# Patient Record
Sex: MALE | Race: WHITE | NOT HISPANIC OR LATINO | Employment: UNEMPLOYED | URBAN - NONMETROPOLITAN AREA
[De-identification: names, ages, dates, MRNs, and addresses within clinical notes are randomized per-mention and may not be internally consistent; named-entity substitution may affect disease eponyms.]

---

## 2023-05-29 ENCOUNTER — OFFICE VISIT (OUTPATIENT)
Dept: FAMILY MEDICINE | Facility: OTHER | Age: 48
End: 2023-05-29
Attending: PHYSICIAN ASSISTANT
Payer: COMMERCIAL

## 2023-05-29 ENCOUNTER — TELEPHONE (OUTPATIENT)
Dept: FAMILY MEDICINE | Facility: OTHER | Age: 48
End: 2023-05-29

## 2023-05-29 VITALS
HEIGHT: 68 IN | SYSTOLIC BLOOD PRESSURE: 108 MMHG | DIASTOLIC BLOOD PRESSURE: 74 MMHG | OXYGEN SATURATION: 93 % | HEART RATE: 116 BPM | TEMPERATURE: 98 F | BODY MASS INDEX: 20.76 KG/M2 | RESPIRATION RATE: 16 BRPM | WEIGHT: 137 LBS

## 2023-05-29 DIAGNOSIS — K21.9 GASTROESOPHAGEAL REFLUX DISEASE, UNSPECIFIED WHETHER ESOPHAGITIS PRESENT: Primary | ICD-10-CM

## 2023-05-29 PROCEDURE — G0463 HOSPITAL OUTPT CLINIC VISIT: HCPCS

## 2023-05-29 PROCEDURE — 99203 OFFICE O/P NEW LOW 30 MIN: CPT | Performed by: NURSE PRACTITIONER

## 2023-05-29 RX ORDER — OMEPRAZOLE 40 MG/1
40 CAPSULE, DELAYED RELEASE ORAL DAILY
Qty: 90 CAPSULE | Refills: 0 | Status: SHIPPED | OUTPATIENT
Start: 2023-05-29 | End: 2023-08-27

## 2023-05-29 ASSESSMENT — PAIN SCALES - GENERAL: PAINLEVEL: EXTREME PAIN (8)

## 2023-05-29 NOTE — TELEPHONE ENCOUNTER
Called [facility] to inform of one 40mg dose today.   Ashley Thompson, LPN on 5/29/2023 at 12:52 PM

## 2023-05-29 NOTE — PROGRESS NOTES
ASSESSMENT/PLAN:     I have reviewed the nursing notes.  I have reviewed the findings, diagnosis, plan and need for follow up with the patient.        1. Gastroesophageal reflux disease, unspecified whether esophagitis present    - omeprazole (PRILOSEC) 40 MG DR capsule; Take 1 capsule (40 mg) by mouth daily for 90 days  Dispense: 90 capsule; Refill: 0      Symptomatic treatment - take medication 30 to 60 minutes prior to meal, avoids meals 2 to 3 hours prior to bedtime, sleep elevated, eat smaller meals, etc      Discussed warning signs/symptoms indicative of need to f/u  Follow up if symptoms persist or worsen or concerns      I explained my diagnostic considerations and recommendations to the patient, who voiced understanding and agreement with the treatment plan. All questions were answered. We discussed potential side effects of any prescribed or recommended therapies, as well as expectations for response to treatments.    Jossy Lara NP  [facility]      SUBJECTIVE:   Hugo Kaiser is a 47 year old male who presents to clinic today for the following health issues:  Needs Prilosec    HPI   Hx of acid reflux with use of daily Prilosec 20 mg daily for the past 10 years.  He has been out the past 4 to 5 days as he admitted himself anteriorly to [facility] for meth use and needs medications as a prescription and not over-the-counter.  Symptoms including burning, tightness, and pressure in throat and chest.  Symptoms are controlled with medication but symptomatic if not treating. No chest pain or palpitations.  No light headedness or dizziness.  Occasional shortness of breath and cough due to tobacco use.  No fevers.  No abdominal pain.  No abnormal stools or bloody stools.          No past medical history on file.  No past surgical history on file.  Social History     Tobacco Use     Smoking status: Every Day     Types: Cigarettes     Smokeless tobacco: Former     Types:  "Chew   Vaping Use     Vaping status: Never Used   Substance Use Topics     Alcohol use: Not Currently     No current outpatient medications on file.     Allergies   Allergen Reactions     Measles, Mumps & Rubella Vac          Past medical history, past surgical history, current medications and allergies reviewed and accurate to the best of my knowledge.        OBJECTIVE:     /74 (BP Location: Right arm, Patient Position: Sitting, Cuff Size: Adult Regular)   Pulse 116   Temp 98  F (36.7  C) (Tympanic)   Resp 16   Ht 1.727 m (5' 8\")   Wt 62.1 kg (137 lb)   SpO2 93%   BMI 20.83 kg/m    Body mass index is 20.83 kg/m .     Physical Exam  General Appearance: Well appearing adult male, appropriate appearance for age. No acute distress  Neck: supple without adenopathy  Respiratory: normal chest wall and respirations.  Normal effort.  Clear to auscultation bilaterally, no wheezing, crackles or rhonchi.  No increased work of breathing.  No cough appreciated.  Cardiac: RRR with no murmurs  Abdomen:  Mild epigastric tenderness  Musculoskeletal:  Equal movement of bilateral upper extremities.  Equal movement of bilateral lower extremities.  Normal gait.   Psychological: normal affect, alert, oriented, and pleasant, tearful and emotional    "

## 2023-05-29 NOTE — NURSING NOTE
"Pt presents to  for acid reflux. Pt currently in treatment and needs to have prescribed med to take for it.    Chief Complaint   Patient presents with     Gastrophageal Reflux       FOOD SECURITY SCREENING QUESTIONS  Hunger Vital Signs:  Within the past 12 months we worried whether our food would run out before we got money to buy more. Never  Within the past 12 months the food we bought just didn't last and we didn't have money to get more. Never  Lauren Dearmon 5/29/2023 10:12 AM      Initial /74 (BP Location: Right arm, Patient Position: Sitting, Cuff Size: Adult Regular)   Pulse 116   Temp 98  F (36.7  C) (Tympanic)   Resp 16   Ht 1.727 m (5' 8\")   Wt 62.1 kg (137 lb)   SpO2 93%   BMI 20.83 kg/m   Estimated body mass index is 20.83 kg/m  as calculated from the following:    Height as of this encounter: 1.727 m (5' 8\").    Weight as of this encounter: 62.1 kg (137 lb).  Medication Reconciliation: complete    Lauren Harsha  "

## 2023-05-29 NOTE — TELEPHONE ENCOUNTER
We increased the dose from 20 mg to 40 mg so he will take 1 dose of 40 mg today and continue daily.  Jossy Lara NP on 5/29/2023 at 12:43 PM

## 2023-05-31 ENCOUNTER — OFFICE VISIT (OUTPATIENT)
Dept: INTERNAL MEDICINE | Facility: OTHER | Age: 48
End: 2023-05-31
Payer: COMMERCIAL

## 2023-05-31 VITALS
DIASTOLIC BLOOD PRESSURE: 62 MMHG | BODY MASS INDEX: 20.92 KG/M2 | RESPIRATION RATE: 16 BRPM | WEIGHT: 137.6 LBS | OXYGEN SATURATION: 96 % | SYSTOLIC BLOOD PRESSURE: 120 MMHG | HEART RATE: 115 BPM | TEMPERATURE: 97.8 F

## 2023-05-31 DIAGNOSIS — K04.7 TOOTH INFECTION: Primary | ICD-10-CM

## 2023-05-31 PROCEDURE — G0463 HOSPITAL OUTPT CLINIC VISIT: HCPCS

## 2023-05-31 PROCEDURE — 99213 OFFICE O/P EST LOW 20 MIN: CPT

## 2023-05-31 RX ORDER — PENICILLIN V POTASSIUM 500 MG/1
500 TABLET, FILM COATED ORAL 2 TIMES DAILY
Qty: 20 TABLET | Refills: 0 | Status: SHIPPED | OUTPATIENT
Start: 2023-05-31 | End: 2023-06-10

## 2023-05-31 ASSESSMENT — PAIN SCALES - GENERAL: PAINLEVEL: SEVERE PAIN (6)

## 2023-05-31 NOTE — NURSING NOTE
Chief Complaint   Patient presents with     Dental Problem         Medication Reconciliation: greg Moreno

## 2023-05-31 NOTE — PROGRESS NOTES
Assessment & Plan   Hugo Kaiser is a 47 year old presenting for the following health issues:      ICD-10-CM    1. Tooth infection  K04.7 penicillin V (VEETID) 500 MG tablet        Started patient on a 10-day course of penicillin for tooth infection.  Recommended that patient be rescreened for STDs and/or UTI since he is still continuing to have occasional episodes of dysuria.  Patient declined wanted to pursue further testing-would like to be treated with penicillin to see if symptoms improve.  Instructed him that if he notices that symptoms worsen or do not improve he should come back in to be reevaluated.  Patient verbalized understanding.  Instructed patient that he should establish with a dentist as soon as possible for further evaluation and treatment for infected tooth.    No follow-ups on file.    ROMEO [facility]      Sofya Arias is a 47 year old, presenting for the following health issues:  Dental Problem    HPI     Patient presents to clinic for evaluation of lower front tooth pain.  He does have a history of methamphetamine use and has overall poor dentition.  Has had dental infections in the past along with having teeth removed.  He states that he noticed the pain this morning and has noticed slight amount of redness to the gum surrounding the tooth.    Additionally, he states that several weeks back he tested positive for an STD.  He was given a penicillin shot and was started on doxycycline, patient states that he took doxycycline for approximately 5 days.  He noticed that he is having slight burning with urination, unsure whether the STD has resolved.      Review of Systems   Constitutional: Negative for fatigue and fever.   HENT: Positive for dental problem.    Genitourinary: Positive for dysuria. Negative for decreased urine volume, flank pain, frequency and urgency.   All other systems reviewed and are negative.           Objective    /62    Pulse 115   Temp 97.8  F (36.6  C) (Tympanic)   Resp 16   Wt 62.4 kg (137 lb 9.6 oz)   SpO2 96%   BMI 20.92 kg/m    Body mass index is 20.92 kg/m .  Physical Exam  Vitals reviewed.   Constitutional:       General: He is not in acute distress.     Appearance: He is not toxic-appearing.   HENT:      Mouth/Throat:      Mouth: Mucous membranes are moist. No injury or oral lesions.      Dentition: Abnormal dentition. Dental tenderness and dental caries present.      Pharynx: Oropharynx is clear. No pharyngeal swelling or posterior oropharyngeal erythema.      Tonsils: No tonsillar exudate or tonsillar abscesses.        Comments: Poor dentition, missing teeth  Neurological:      Mental Status: He is alert.

## 2023-05-31 NOTE — PROGRESS NOTES
SUBJECTIVE:   CC: Hugo is an 47 year old who presents for preventative health visit.     Healthy Habits:     Getting at least 3 servings of Calcium per day:  Yes    Bi-annual eye exam:  NO    Dental care twice a year:  NO    Sleep apnea or symptoms of sleep apnea:  Excessive snoring    Diet:  Regular (no restrictions)    Frequency of exercise:  1 day/week    Duration of exercise:  15-30 minutes    Taking medications regularly:  Yes    Medication side effects:  None and No significant flushing    PHQ-2 Total Score: 0    Additional concerns today:  No    Here for intake physical for [facility]. History of methamphetamine abuse for which he chose to be inpatient for at this time. Has been going well.     Has continued on Prilosec for GERD. Feeling much improved. No side effects.     Seen yesterday and was started on penicillin for dental infection. Does not follow with dentist regularly.     STD concerns: No  Cholesterol/DM concerns/screening: Due, declined  Prostate cancer screening discussed:  Not indicated, patient is average risk and younger than 50.  Family history of colon or prostate CA?: No  Colonoscopy: Due for first screening- declined today  Tobacco use: Current smoker  Hepatitis C screen: Declined  HIV screen: Declined  Immunizations: Declined- unable to review updated MIIC      Social History     Tobacco Use     Smoking status: Every Day     Types: Cigarettes     Smokeless tobacco: Former     Types: Chew   Vaping Use     Vaping status: Never Used   Substance Use Topics     Alcohol use: Not Currently         Last PSA: No results found for: PSA    Reviewed orders with patient. Reviewed health maintenance and updated orders accordingly - Yes      Reviewed and updated as needed this visit by clinical staff   Tobacco  Allergies  Meds  Problems  Med Hx  Surg Hx  Fam Hx  Soc   Hx        Reviewed and updated as needed this visit by Provider   Tobacco  Allergies  Meds  Problems  Med Hx  Surg Hx  " Fam Hx         History reviewed. No pertinent past medical history.   History reviewed. No pertinent surgical history.    Review of Systems   Constitutional: Negative for chills and fever.   HENT: Negative for congestion, ear pain, hearing loss and sore throat.    Eyes: Negative for pain and visual disturbance.   Respiratory: Negative for cough and shortness of breath.    Cardiovascular: Positive for peripheral edema. Negative for chest pain and palpitations.   Gastrointestinal: Positive for heartburn. Negative for abdominal pain, constipation, diarrhea, hematochezia and nausea.   Genitourinary: Negative for dysuria, frequency, genital sores, hematuria, impotence, penile discharge and urgency.   Musculoskeletal: Positive for arthralgias. Negative for joint swelling and myalgias.   Skin: Negative for rash.   Neurological: Negative for dizziness, weakness, headaches and paresthesias.   Psychiatric/Behavioral: Negative for mood changes. The patient is nervous/anxious.        OBJECTIVE:   /81   Pulse 97   Temp 96.9  F (36.1  C)   Resp 18   Ht 1.727 m (5' 8\")   Wt 64.4 kg (142 lb)   SpO2 96%   BMI 21.59 kg/m      Physical Exam  GENERAL: healthy, alert and no distress  EYES: Eyes grossly normal to inspection, PERRL and conjunctivae and sclerae normal  HENT: ear canals and TM's normal, nose and mouth without ulcers or lesions  NECK: no adenopathy, no asymmetry, masses, or scars and thyroid normal to palpation  RESP: lungs clear to auscultation - no rales, rhonchi or wheezes  CV: regular rate and rhythm, normal S1 S2, no S3 or S4, no murmur, click or rub, no peripheral edema and peripheral pulses strong  MS: no gross musculoskeletal defects noted, no edema  SKIN: no suspicious lesions or rashes  NEURO: Normal strength and tone, mentation intact and speech normal  PSYCH: mentation appears normal, affect normal/bright    Diagnostic Test Results:  none     ASSESSMENT/PLAN:       ICD-10-CM    1. Routine history " and physical examination of adult  Z00.00       2. Methamphetamine abuse (H)  F15.10       3. Gastroesophageal reflux disease, unspecified whether esophagitis present  K21.9       4. Tobacco use  Z72.0         1, 2. Ok to continue with inpatient chemical dependency treatment. Patient declined updating labs, immunizations, and preventative exams. Encourage healthy lifestyle.     3. Chronic, well controlled.     4. Encourage cessation, patient not interested at this time.     Patient has been advised of split billing requirements and indicates understanding: Yes      COUNSELING:   Reviewed preventive health counseling, as reflected in patient instructions        He reports that he has been smoking cigarettes. He has quit using smokeless tobacco.  His smokeless tobacco use included chew.  Nicotine/Tobacco Cessation Plan:   Information offered: Patient not interested at this time        Melissa Duenas PA-C  [facility]

## 2023-06-01 ENCOUNTER — OFFICE VISIT (OUTPATIENT)
Dept: FAMILY MEDICINE | Facility: OTHER | Age: 48
End: 2023-06-01
Attending: PHYSICIAN ASSISTANT
Payer: COMMERCIAL

## 2023-06-01 VITALS
RESPIRATION RATE: 18 BRPM | BODY MASS INDEX: 21.52 KG/M2 | WEIGHT: 142 LBS | HEIGHT: 68 IN | DIASTOLIC BLOOD PRESSURE: 81 MMHG | HEART RATE: 97 BPM | OXYGEN SATURATION: 96 % | TEMPERATURE: 96.9 F | SYSTOLIC BLOOD PRESSURE: 109 MMHG

## 2023-06-01 DIAGNOSIS — F15.10 METHAMPHETAMINE ABUSE (H): ICD-10-CM

## 2023-06-01 DIAGNOSIS — Z00.00 ROUTINE HISTORY AND PHYSICAL EXAMINATION OF ADULT: Primary | ICD-10-CM

## 2023-06-01 DIAGNOSIS — K21.9 GASTROESOPHAGEAL REFLUX DISEASE, UNSPECIFIED WHETHER ESOPHAGITIS PRESENT: ICD-10-CM

## 2023-06-01 DIAGNOSIS — Z72.0 TOBACCO USE: ICD-10-CM

## 2023-06-01 PROCEDURE — G0463 HOSPITAL OUTPT CLINIC VISIT: HCPCS

## 2023-06-01 PROCEDURE — 99213 OFFICE O/P EST LOW 20 MIN: CPT | Mod: 25 | Performed by: PHYSICIAN ASSISTANT

## 2023-06-01 PROCEDURE — 99396 PREV VISIT EST AGE 40-64: CPT | Performed by: PHYSICIAN ASSISTANT

## 2023-06-01 ASSESSMENT — ENCOUNTER SYMPTOMS
SHORTNESS OF BREATH: 0
CHILLS: 0
FEVER: 0
WEAKNESS: 0
NAUSEA: 0
FATIGUE: 0
SORE THROAT: 0
HEARTBURN: 1
MYALGIAS: 0
HEMATURIA: 0
FLANK PAIN: 0
PARESTHESIAS: 0
PALPITATIONS: 0
EYE PAIN: 0
FREQUENCY: 0
ARTHRALGIAS: 1
COUGH: 0
ABDOMINAL PAIN: 0
FEVER: 0
FREQUENCY: 0
NERVOUS/ANXIOUS: 1
HEADACHES: 0
HEMATOCHEZIA: 0
DIZZINESS: 0
CONSTIPATION: 0
JOINT SWELLING: 0
DIARRHEA: 0
DYSURIA: 0
DYSURIA: 1

## 2023-06-01 NOTE — NURSING NOTE
Patient presents to clinic for Intake physical for [facility].  Ashlie Ramirez LPN ....................  6/1/2023   7:59 AM

## 2023-06-19 ENCOUNTER — OFFICE VISIT (OUTPATIENT)
Dept: FAMILY MEDICINE | Facility: OTHER | Age: 48
End: 2023-06-19
Attending: NURSE PRACTITIONER
Payer: COMMERCIAL

## 2023-06-19 ENCOUNTER — HOSPITAL ENCOUNTER (OUTPATIENT)
Dept: GENERAL RADIOLOGY | Facility: OTHER | Age: 48
Discharge: HOME | End: 2023-06-19
Attending: STUDENT IN AN ORGANIZED HEALTH CARE EDUCATION/TRAINING PROGRAM
Payer: COMMERCIAL

## 2023-06-19 VITALS
TEMPERATURE: 98.4 F | WEIGHT: 146.7 LBS | RESPIRATION RATE: 16 BRPM | HEART RATE: 84 BPM | OXYGEN SATURATION: 95 % | DIASTOLIC BLOOD PRESSURE: 84 MMHG | HEIGHT: 67 IN | SYSTOLIC BLOOD PRESSURE: 124 MMHG | BODY MASS INDEX: 23.02 KG/M2

## 2023-06-19 DIAGNOSIS — M79.672 LEFT FOOT PAIN: Primary | ICD-10-CM

## 2023-06-19 PROCEDURE — 73630 X-RAY EXAM OF FOOT: CPT | Mod: LT

## 2023-06-19 PROCEDURE — G0463 HOSPITAL OUTPT CLINIC VISIT: HCPCS

## 2023-06-19 PROCEDURE — 99213 OFFICE O/P EST LOW 20 MIN: CPT | Performed by: STUDENT IN AN ORGANIZED HEALTH CARE EDUCATION/TRAINING PROGRAM

## 2023-06-19 RX ORDER — IBUPROFEN 800 MG/1
800 TABLET, FILM COATED ORAL EVERY 8 HOURS PRN
Qty: 30 TABLET | Refills: 0 | Status: SHIPPED | OUTPATIENT
Start: 2023-06-19 | End: 2023-06-29

## 2023-06-19 RX ORDER — ACETAMINOPHEN 500 MG
500-1000 TABLET ORAL EVERY 6 HOURS PRN
Qty: 30 TABLET | Refills: 0 | Status: SHIPPED | OUTPATIENT
Start: 2023-06-19 | End: 2023-06-29

## 2023-06-19 ASSESSMENT — PAIN SCALES - GENERAL: PAINLEVEL: MODERATE PAIN (5)

## 2023-06-19 NOTE — PATIENT INSTRUCTIONS
Left Foot Pain    X-ray without evidence of broken bones.    Rest, ice compression, and elevation.    Take 800 mg ibuprofen every eight hours as needed. Tylenol 1000 mg every six hours as needed. Can be taken together or staggered.     Wear shoes at all times while awake.    Follow up in [place] if symptoms persist.

## 2023-06-19 NOTE — NURSING NOTE
"Chief Complaint   Patient presents with     Ankle Pain     Left ankle pain and swelling for 2 days     He ran to the bathroom 2 days ago and after that he has had left ankle pain. It is painful and swollen. He states he does not remember injuring it.  Corine Cid LPN..................6/19/2023   3:52 PM    Initial /84   Pulse 84   Temp 98.4  F (36.9  C) (Temporal)   Resp 16   Ht 1.702 m (5' 7\")   Wt 66.5 kg (146 lb 11.2 oz)   SpO2 95%   BMI 22.98 kg/m   Estimated body mass index is 22.98 kg/m  as calculated from the following:    Height as of this encounter: 1.702 m (5' 7\").    Weight as of this encounter: 66.5 kg (146 lb 11.2 oz).  Medication Reconciliation: complete    FOOD SECURITY SCREENING QUESTIONS  Hunger Vital Signs:  Within the past 12 months we worried whether our food would run out before we got money to buy more. Never  Within the past 12 months the food we bought just didn't last and we didn't have money to get more. Never        Advance care directive on file? no  Advance care directive provided to patient? declined     Corine Cid LPN  "

## 2023-06-20 NOTE — PROGRESS NOTES
"  Assessment & Plan     (M79.672) Left foot pain  (primary encounter diagnosis)  Comment: Left foot pain.  Unsure of exact allergy.  Consider planter fasciitis.  Consider soft tissue swelling.  No evidence of fracture on x-ray imaging.  There was an incidental cyst found on his calcaneus, unlikely to be the cause of his symptoms.    Plan: XR Foot Left G/E 3 Views, ibuprofen         (ADVIL/MOTRIN) 800 MG tablet, acetaminophen         (TYLENOL) 500 MG tablet    Continue conservative management at this point.  Discussed wearing appropriate footwear with arches to help with symptoms.  Follow-up with primary care if symptoms are persisting.  Return to [facility] or go to the ER if symptoms worsen or change.  He is agreeable with this plan.    Ayse Gay PA-C  [facility]   Hugo is a 47 year old, presenting for the following health issues:  Ankle Pain (Left ankle pain and swelling for 2 days)      HPI     Patient presents today with left foot and ankle pain.  States that the pain is around the medial malleolus.  States he has noticed some medial malleolar swelling but not redness or warmth. He did do some running yesterday x 1 block which is out of his usual but did not twist the ankle.  He has not taken any medications for the pain.  He denies any history of gout.    Review of Systems   Constitutional, HEENT, cardiovascular, pulmonary, gi and gu systems are negative, except as otherwise noted.      Objective    /84   Pulse 84   Temp 98.4  F (36.9  C) (Temporal)   Resp 16   Ht 1.702 m (5' 7\")   Wt 66.5 kg (146 lb 11.2 oz)   SpO2 95%   BMI 22.98 kg/m    Body mass index is 22.98 kg/m .  Physical Exam         XR Foot Left G/E 3 Views        Exam: XR FOOT LEFT G/E 3 VIEWS     History:Male, age 47 years, pain in medial ankle/foot after injury;  Left foot pain    Comparison:  No relevant prior imaging.    Technique: Three views are submitted.    Findings: Bones there is " straightening a large benign-appearing multi  loculated lucent structure in the anterior aspect of the calcaneus,  likely representing a large cyst/cystic lesion. There is no evidence  of a pathologic fracture. No evidence of acute or subacute fracture.   No evidence of dislocation.                                                                          Impression:  No evidence of acute or subacute bony abnormality.     3.5 x 2.5 by approximately 2 cm benign-appearing cystic lesion  involving the anterior aspects of the calcaneus.    SERENA MOORE MD         SYSTEM ID:  [id number]

## 2023-11-11 ENCOUNTER — OFFICE VISIT (OUTPATIENT)
Dept: FAMILY MEDICINE | Facility: OTHER | Age: 48
End: 2023-11-11
Payer: COMMERCIAL

## 2023-11-11 VITALS
BODY MASS INDEX: 23.56 KG/M2 | OXYGEN SATURATION: 97 % | RESPIRATION RATE: 18 BRPM | TEMPERATURE: 99.4 F | HEART RATE: 100 BPM | DIASTOLIC BLOOD PRESSURE: 86 MMHG | WEIGHT: 150.1 LBS | HEIGHT: 67 IN | SYSTOLIC BLOOD PRESSURE: 126 MMHG

## 2023-11-11 DIAGNOSIS — K21.9 GASTROESOPHAGEAL REFLUX DISEASE, UNSPECIFIED WHETHER ESOPHAGITIS PRESENT: Primary | ICD-10-CM

## 2023-11-11 PROCEDURE — G0463 HOSPITAL OUTPT CLINIC VISIT: HCPCS

## 2023-11-11 PROCEDURE — 99213 OFFICE O/P EST LOW 20 MIN: CPT

## 2023-11-11 RX ORDER — OMEPRAZOLE 40 MG/1
40 CAPSULE, DELAYED RELEASE ORAL DAILY
Qty: 90 CAPSULE | Refills: 0 | Status: SHIPPED | OUTPATIENT
Start: 2023-11-11 | End: 2024-02-09

## 2023-11-11 ASSESSMENT — PAIN SCALES - GENERAL: PAINLEVEL: NO PAIN (0)

## 2023-11-11 NOTE — NURSING NOTE
"Chief Complaint   Patient presents with    Gastrophageal Reflux       Initial /86   Pulse 100   Temp 99.4  F (37.4  C) (Tympanic)   Resp 18   Ht 1.702 m (5' 7\")   Wt 68.1 kg (150 lb 1.6 oz)   SpO2 97%   BMI 23.51 kg/m   Estimated body mass index is 23.51 kg/m  as calculated from the following:    Height as of this encounter: 1.702 m (5' 7\").    Weight as of this encounter: 68.1 kg (150 lb 1.6 oz).  Medication Review: complete    The next two questions are to help us understand your food security.  If you are feeling you need any assistance in this area, we have resources available to support you today.          11/11/2023   SDOH- Food Insecurity   Within the past 12 months, did you worry that your food would run out before you got money to buy more? MA   Within the past 12 months, did the food you bought just not last and you didn t have money to get more? MA         Health Care Directive:  Patient does not have a Health Care Directive or Living Will: Discussed advance care planning with patient; however, patient declined at this time.    Miracle Arora, ELIER      "

## 2023-11-11 NOTE — PROGRESS NOTES
ASSESSMENT/PLAN:    I have reviewed the nursing notes.  I have reviewed the findings, diagnosis, plan and need for follow up with the patient.    1. Gastroesophageal reflux disease, unspecified whether esophagitis present  - omeprazole (PRILOSEC) 40 MG DR capsule; Take 1 capsule (40 mg) by mouth daily for 90 days  Dispense: 90 capsule; Refill: 0    Physical exam and symptoms consistent with an exacerbation of patient's chronic GERD.  Will refill patient's omeprazole. The pathophysiology of reflux is discussed.  Anti-reflux measures such as raising the head of the bed, avoiding tight clothing or belts, avoiding eating late at night and not lying down shortly after mealtime are discussed. Avoid ASA, NSAID's, caffeine, peppermints, alcohol and tobacco.     Discussed warning signs/symptoms indicative of need to f/u    Follow up if symptoms persist or worsen or concerns    I explained my diagnostic considerations and recommendations to the patient, who voiced understanding and agreement with the treatment plan. All questions were answered. We discussed potential side effects of any prescribed or recommended therapies, as well as expectations for response to treatments.    Dallas Brannon, ROMEO CNP  11/11/2023  3:14 PM    HPI:    Hugo Kaiser is a 47 year old male  who [facility] concerns of acid [facility]    He has been experiencing fullness after meals, abdominal bloating, heartburn, nocturnal burning, upper abdominal discomfort, symptoms primarily relate to meals, and lying down after meals, symptoms occur at night, cough for many months, unchanged over time. ROS: patient denies abdominal pain, chest pain, cough, wheezing, weight loss, dysphagia, black stools, hemetemesis, diarrhea, constipation, fever, history of PUD, or history of GI bleeding. Social history: no or minimal alcohol, smoking 1/2 PPD, no or mild caffeine use, no ASA or NSAID's.      History reviewed. No pertinent past medical  "history.  History reviewed. No pertinent surgical history.  Social History     Tobacco Use    Smoking status: Every Day     Types: Cigarettes    Smokeless tobacco: Former     Types: Chew   Substance Use Topics    Alcohol use: Not Currently     No current outpatient medications on file.     Allergies   Allergen Reactions    Measles, Mumps & Rubella Vac      Past medical history, past surgical history, current medications and allergies reviewed and accurate to the best of my knowledge.      ROS:  Refer to HPI    /86   Pulse 100   Temp 99.4  F (37.4  C) (Tympanic)   Resp 18   Ht 1.702 m (5' 7\")   Wt 68.1 kg (150 lb 1.6 oz)   SpO2 97%   BMI 23.51 kg/m      EXAM:  General Appearance: Well appearing 47 year old male, appropriate appearance for age. No acute distress   Oropharynx: moist mucous membranes, posterior pharynx without erythema, tonsils symmetric and 1+, no erythema, no exudates or petechiae, no post nasal drip seen, no trismus, voice clear.    Nose:  Bilateral nares: no erythema, no edema, no drainage or congestion   Neck: supple without adenopathy  Respiratory: normal chest wall and respirations.  Normal effort.  Clear to auscultation bilaterally, no wheezing, crackles or rhonchi.  No increased work of breathing.  No cough appreciated.  Cardiac: RRR with no murmurs  Abdomen: soft, nontender, no rigidity, no rebound tenderness or guarding, normal bowel sounds present  Neuro: Alert and oriented to person, place, and time.    Psychological: normal affect, alert, oriented, and pleasant.       "

## 2023-11-17 ENCOUNTER — OFFICE VISIT (OUTPATIENT)
Dept: FAMILY MEDICINE | Facility: OTHER | Age: 48
End: 2023-11-17
Attending: NURSE PRACTITIONER
Payer: COMMERCIAL

## 2023-11-17 VITALS
OXYGEN SATURATION: 98 % | HEART RATE: 97 BPM | TEMPERATURE: 99 F | WEIGHT: 150.4 LBS | RESPIRATION RATE: 16 BRPM | BODY MASS INDEX: 23.56 KG/M2 | SYSTOLIC BLOOD PRESSURE: 110 MMHG | DIASTOLIC BLOOD PRESSURE: 82 MMHG

## 2023-11-17 DIAGNOSIS — G89.29 CHRONIC RIGHT SHOULDER PAIN: ICD-10-CM

## 2023-11-17 DIAGNOSIS — M25.511 CHRONIC RIGHT SHOULDER PAIN: ICD-10-CM

## 2023-11-17 DIAGNOSIS — Z72.0 TOBACCO USE: ICD-10-CM

## 2023-11-17 DIAGNOSIS — F15.10 METHAMPHETAMINE ABUSE (H): ICD-10-CM

## 2023-11-17 DIAGNOSIS — K21.9 GASTROESOPHAGEAL REFLUX DISEASE, UNSPECIFIED WHETHER ESOPHAGITIS PRESENT: Primary | ICD-10-CM

## 2023-11-17 PROCEDURE — G0463 HOSPITAL OUTPT CLINIC VISIT: HCPCS

## 2023-11-17 PROCEDURE — 99214 OFFICE O/P EST MOD 30 MIN: CPT | Performed by: NURSE PRACTITIONER

## 2023-11-17 RX ORDER — ACETAMINOPHEN 500 MG
500-1000 TABLET ORAL EVERY 6 HOURS PRN
Qty: 90 TABLET | Refills: 1 | Status: SHIPPED | OUTPATIENT
Start: 2023-11-17

## 2023-11-17 ASSESSMENT — ANXIETY QUESTIONNAIRES
4. TROUBLE RELAXING: SEVERAL DAYS
5. BEING SO RESTLESS THAT IT IS HARD TO SIT STILL: SEVERAL DAYS
IF YOU CHECKED OFF ANY PROBLEMS ON THIS QUESTIONNAIRE, HOW DIFFICULT HAVE THESE PROBLEMS MADE IT FOR YOU TO DO YOUR WORK, TAKE CARE OF THINGS AT HOME, OR GET ALONG WITH OTHER PEOPLE: SOMEWHAT DIFFICULT
3. WORRYING TOO MUCH ABOUT DIFFERENT THINGS: NOT AT ALL
6. BECOMING EASILY ANNOYED OR IRRITABLE: NOT AT ALL
2. NOT BEING ABLE TO STOP OR CONTROL WORRYING: NOT AT ALL
GAD7 TOTAL SCORE: 3
7. FEELING AFRAID AS IF SOMETHING AWFUL MIGHT HAPPEN: NOT AT ALL
1. FEELING NERVOUS, ANXIOUS, OR ON EDGE: SEVERAL DAYS
GAD7 TOTAL SCORE: 3

## 2023-11-17 ASSESSMENT — PATIENT HEALTH QUESTIONNAIRE - PHQ9
SUM OF ALL RESPONSES TO PHQ QUESTIONS 1-9: 5
SUM OF ALL RESPONSES TO PHQ QUESTIONS 1-9: 5
10. IF YOU CHECKED OFF ANY PROBLEMS, HOW DIFFICULT HAVE THESE PROBLEMS MADE IT FOR YOU TO DO YOUR WORK, TAKE CARE OF THINGS AT HOME, OR GET ALONG WITH OTHER PEOPLE: SOMEWHAT DIFFICULT

## 2023-11-17 ASSESSMENT — PAIN SCALES - GENERAL: PAINLEVEL: MILD PAIN (3)

## 2023-11-17 NOTE — NURSING NOTE
"Chief Complaint   Patient presents with    Consult     [facility]       Initial /82   Pulse 97   Temp 99  F (37.2  C) (Tympanic)   Resp 16   Wt 68.2 kg (150 lb 6.4 oz)   SpO2 98%   BMI 23.56 kg/m   Estimated body mass index is 23.56 kg/m  as calculated from the following:    Height as of 11/11/23: 1.702 m (5' 7\").    Weight as of this encounter: 68.2 kg (150 lb 6.4 oz).  Medication Reconciliation: complete        "
